# Patient Record
Sex: MALE | Race: WHITE | NOT HISPANIC OR LATINO | Employment: OTHER | URBAN - METROPOLITAN AREA
[De-identification: names, ages, dates, MRNs, and addresses within clinical notes are randomized per-mention and may not be internally consistent; named-entity substitution may affect disease eponyms.]

---

## 2017-04-13 ENCOUNTER — PREPPED CHART (OUTPATIENT)
Dept: URBAN - METROPOLITAN AREA CLINIC 43 | Facility: CLINIC | Age: 82
End: 2017-04-13

## 2017-04-20 NOTE — PATIENT DISCUSSION
Considering  Surgery Counseling: I have discussed the option of scheduling surgery versus following, as well as the risks, benefits and alternatives of cataract surgery with the patient. It was explained that the surgery is elective, there is no rush and there is no harm in waiting to have surgery. It was also explained that there is no guarantee that removing the cataract will improve their vision.

## 2017-04-20 NOTE — PATIENT DISCUSSION
Cataracts are visually significant and impacting patients quality of life.   Refer to Ana Gross for Cataract Removal Consultation

## 2017-05-15 NOTE — PATIENT DISCUSSION
New Prescription: Ocuflox (ofloxacin): drops: 0.3% 1 drop four times a day as directed into affected eye 05-

## 2017-05-15 NOTE — PATIENT DISCUSSION
New Prescription: Acular (ketorolac): drops: 0.5% 1 drop four times a day as directed into affected eye 05-

## 2017-05-15 NOTE — PATIENT DISCUSSION
New Prescription: Pred Forte (prednisolone acetate): drops,suspension: 1% 1 drop four times a day as directed into affected eye 05-

## 2017-07-10 NOTE — PATIENT DISCUSSION
Continue: Pred Forte (prednisolone acetate): drops,suspension: 1% 1 drop four times a day as directed into affected eye 05-

## 2017-08-23 NOTE — PATIENT DISCUSSION
2 WEEK POST-OP- All looks good. Stop Acular and Ocuflox. Start to taper off of Pred-forte as directed. Rx was given for glasses and/or readers recommended.   va mildy reduced however some dryness on cornea, suggested warm compresses bid and pfat

## 2018-04-13 ENCOUNTER — ESTABLISHED COMPREHENSIVE EXAM (OUTPATIENT)
Dept: URBAN - METROPOLITAN AREA CLINIC 43 | Facility: CLINIC | Age: 83
End: 2018-04-13

## 2018-04-13 DIAGNOSIS — H43.813: ICD-10-CM

## 2018-04-13 DIAGNOSIS — H35.3130: ICD-10-CM

## 2018-04-13 DIAGNOSIS — Z96.1: ICD-10-CM

## 2018-04-13 DIAGNOSIS — E11.9: ICD-10-CM

## 2018-04-13 PROCEDURE — 2019F DILATED MACUL EXAM DONE: CPT

## 2018-04-13 PROCEDURE — 2022F DILAT RTA XM EVC RTNOPTHY: CPT

## 2018-04-13 PROCEDURE — 1036F TOBACCO NON-USER: CPT

## 2018-04-13 PROCEDURE — 3072F LOW RISK FOR RETINOPATHY: CPT

## 2018-04-13 PROCEDURE — 4177F TALK PT/CRGVR RE AREDS PREV: CPT

## 2018-04-13 PROCEDURE — G8427 DOCREV CUR MEDS BY ELIG CLIN: HCPCS

## 2018-04-13 PROCEDURE — G8756 NO BP MEASURE DOC: HCPCS

## 2018-04-13 PROCEDURE — 92014 COMPRE OPH EXAM EST PT 1/>: CPT

## 2018-04-13 ASSESSMENT — VISUAL ACUITY
OS_PH: 20/30-1
OS_SC: 20/60-1
OS_CC: J1+
OS_CC: 20/30
OS_SC: J8

## 2018-04-13 ASSESSMENT — TONOMETRY
OD_IOP_MMHG: 20
OS_IOP_MMHG: 16

## 2018-05-01 ENCOUNTER — ESTABLISHED PATIENT (OUTPATIENT)
Dept: URBAN - METROPOLITAN AREA CLINIC 39 | Facility: CLINIC | Age: 83
End: 2018-05-01

## 2018-05-01 ENCOUNTER — SURGERY/PROCEDURE (OUTPATIENT)
Dept: URBAN - METROPOLITAN AREA SURGERY 14 | Facility: SURGERY | Age: 83
End: 2018-05-01

## 2018-05-01 VITALS
HEART RATE: 53 BPM | RESPIRATION RATE: 14 BRPM | DIASTOLIC BLOOD PRESSURE: 75 MMHG | HEIGHT: 60 IN | SYSTOLIC BLOOD PRESSURE: 140 MMHG

## 2018-05-01 DIAGNOSIS — H26.491: ICD-10-CM

## 2018-05-01 PROCEDURE — 66821 AFTER CATARACT LASER SURGERY: CPT

## 2018-05-01 PROCEDURE — G8756 NO BP MEASURE DOC: HCPCS

## 2018-05-01 PROCEDURE — G8428 CUR MEDS NOT DOCUMENT: HCPCS

## 2018-05-01 PROCEDURE — 92014 COMPRE OPH EXAM EST PT 1/>: CPT

## 2018-05-01 PROCEDURE — 1036F TOBACCO NON-USER: CPT

## 2018-05-01 ASSESSMENT — TONOMETRY
OS_IOP_MMHG: 20
OD_IOP_MMHG: 20

## 2018-05-01 ASSESSMENT — VISUAL ACUITY
OD_SC: <J12
OS_SC: J8
OS_SC: 20/60-1

## 2018-05-08 ENCOUNTER — PO YAG CAP LASER DILATION (OUTPATIENT)
Dept: URBAN - METROPOLITAN AREA CLINIC 43 | Facility: CLINIC | Age: 83
End: 2018-05-08

## 2018-05-08 DIAGNOSIS — Z98.890: ICD-10-CM

## 2018-05-08 PROCEDURE — 99024 POSTOP FOLLOW-UP VISIT: CPT

## 2018-05-08 ASSESSMENT — VISUAL ACUITY
OS_SC: 20/70+1
OD_SC: CF 5FT
OS_PH: 20/50+1

## 2018-05-08 ASSESSMENT — TONOMETRY: OD_IOP_MMHG: 20

## 2018-11-09 NOTE — PATIENT DISCUSSION
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 For this reason regular follow-up with an eye doctor is essential. Patient to continue regular appointments with Dr. Renuka Padron to control/monitor blood sugar. Educated patient about importance of healthy diet/lifestyle. Monitor.

## 2018-11-09 NOTE — PATIENT DISCUSSION
PVD DISCUSSION:  I have reassured the patient that the posterior vitreous detachment is a benign phenomenon. The floater should become less symptomatic over time. The patient is instructed to return immediately if any symptoms of retinal detachment should occur including flashes, new floaters, or sudden loss of vision. I have further explained not all patients who develop a tear or detachment have notable symptoms, therefore, compliance with return visits are necessary. The patient was instructed to contact us immediately if they notice any of the signs or symptoms of retinal detachment as noted above as the prognosis for any potential treatment options may be time related.  Return for follow-up as scheduled

## 2019-04-10 NOTE — PATIENT DISCUSSION
Patient defers refraction on today's examination even though I recommended a refraction due to current visual acuity.

## 2024-06-25 ENCOUNTER — NEW PATIENT (OUTPATIENT)
Dept: URBAN - METROPOLITAN AREA CLINIC 43 | Facility: CLINIC | Age: 89
End: 2024-06-25

## 2024-06-25 DIAGNOSIS — H43.813: ICD-10-CM

## 2024-06-25 DIAGNOSIS — H35.3130: ICD-10-CM

## 2024-06-25 DIAGNOSIS — E11.9: ICD-10-CM

## 2024-06-25 DIAGNOSIS — H35.372: ICD-10-CM

## 2024-06-25 PROCEDURE — 99199RRD RESIDENT RENDERING PROVIDER

## 2024-06-25 PROCEDURE — 92134 CPTRZ OPH DX IMG PST SGM RTA: CPT

## 2024-06-25 PROCEDURE — 99204 OFFICE O/P NEW MOD 45 MIN: CPT

## 2024-06-25 ASSESSMENT — VISUAL ACUITY
OU_SC: 20/100
OS_SC: 20/100
OD_CC: CF 6FT
OS_CC: 20/70
OS_PH: 20/40
OU_CC: 20/60
OS_CC: J4
OD_SC: CF 6FT

## 2024-06-25 ASSESSMENT — TONOMETRY
OS_IOP_MMHG: 18
OD_IOP_MMHG: 20